# Patient Record
Sex: MALE | NOT HISPANIC OR LATINO | Employment: OTHER | URBAN - METROPOLITAN AREA
[De-identification: names, ages, dates, MRNs, and addresses within clinical notes are randomized per-mention and may not be internally consistent; named-entity substitution may affect disease eponyms.]

---

## 2024-03-22 ENCOUNTER — OFFICE VISIT (OUTPATIENT)
Dept: PRIMARY CARE | Facility: CLINIC | Age: 39
End: 2024-03-22
Payer: COMMERCIAL

## 2024-03-22 VITALS
SYSTOLIC BLOOD PRESSURE: 132 MMHG | TEMPERATURE: 97.9 F | BODY MASS INDEX: 36.58 KG/M2 | RESPIRATION RATE: 18 BRPM | HEART RATE: 76 BPM | WEIGHT: 247 LBS | HEIGHT: 69 IN | OXYGEN SATURATION: 95 % | DIASTOLIC BLOOD PRESSURE: 84 MMHG

## 2024-03-22 DIAGNOSIS — Z00.00 HEALTHCARE MAINTENANCE: ICD-10-CM

## 2024-03-22 DIAGNOSIS — E78.5 HYPERLIPIDEMIA, UNSPECIFIED HYPERLIPIDEMIA TYPE: ICD-10-CM

## 2024-03-22 DIAGNOSIS — R73.9 HYPERGLYCEMIA: ICD-10-CM

## 2024-03-22 DIAGNOSIS — R03.0 ELEVATED BLOOD PRESSURE READING: ICD-10-CM

## 2024-03-22 DIAGNOSIS — E66.9 OBESITY (BMI 30-39.9): ICD-10-CM

## 2024-03-22 DIAGNOSIS — R53.83 FATIGUE, UNSPECIFIED TYPE: Primary | ICD-10-CM

## 2024-03-22 DIAGNOSIS — R79.89 LOW TESTOSTERONE IN MALE: ICD-10-CM

## 2024-03-22 DIAGNOSIS — G47.33 OBSTRUCTIVE SLEEP APNEA: ICD-10-CM

## 2024-03-22 PROCEDURE — 83036 HEMOGLOBIN GLYCOSYLATED A1C: CPT | Performed by: FAMILY MEDICINE

## 2024-03-22 PROCEDURE — 99214 OFFICE O/P EST MOD 30 MIN: CPT | Performed by: FAMILY MEDICINE

## 2024-03-22 PROCEDURE — 1036F TOBACCO NON-USER: CPT | Performed by: FAMILY MEDICINE

## 2024-03-22 ASSESSMENT — PATIENT HEALTH QUESTIONNAIRE - PHQ9
SUM OF ALL RESPONSES TO PHQ9 QUESTIONS 1 AND 2: 0
2. FEELING DOWN, DEPRESSED OR HOPELESS: NOT AT ALL
1. LITTLE INTEREST OR PLEASURE IN DOING THINGS: NOT AT ALL

## 2024-03-22 ASSESSMENT — ENCOUNTER SYMPTOMS
LOSS OF SENSATION IN FEET: 0
SHORTNESS OF BREATH: 0
OCCASIONAL FEELINGS OF UNSTEADINESS: 0
FATIGUE: 1
HEADACHES: 0
DEPRESSION: 0

## 2024-03-22 NOTE — PROGRESS NOTES
"Subjective     Alcon Ramírez is a 38 y.o. male who presents for Weight Gain, Fatigue, and Diabetes.    Fatigue  Associated symptoms include fatigue. Pertinent negatives include no chest pain or headaches.   Diabetes  Pertinent negatives for hypoglycemia include no headaches. Associated symptoms include fatigue. Pertinent negatives for diabetes include no chest pain.      Pt is re-establishing care with me.  He was last seen in 2021.  He has hx of BRIAN however cannot tolerate cpap.  He also has hx of prediabetes/obesity/metabolic syndrome.  He was seeing [facility] for metabolic syndrome and was formerly on trulicity however he stopped it after a few months due to concerns for side effects.  He will be seeing endo again in a few months.  He is concerned about his fatigue.  He has low testosterone history.  He is not on testosterone replacement.      Review of Systems   Constitutional:  Positive for fatigue.   Respiratory:  Negative for shortness of breath.    Cardiovascular:  Negative for chest pain.   Neurological:  Negative for headaches.       Objective     Vitals:    03/22/24 1320   BP: 132/84   BP Location: Left arm   Patient Position: Sitting   Pulse: 76   Resp: 18   Temp: 36.6 °C (97.9 °F)   TempSrc: Temporal   SpO2: 95%   Weight: 112 kg (247 lb)   Height: 1.753 m (5' 9\")      Recheck bp 142/92    No current outpatient medications     No Known Allergies     Past Surgical History:   Procedure Laterality Date    KNEE SURGERY  03/07/2017    Knee Surgery    OTHER SURGICAL HISTORY  03/07/2017    Oral Surgery Tooth Extraction [place] Tooth    WISDOM TOOTH EXTRACTION          Social History     Tobacco Use    Smoking status: Never    Smokeless tobacco: Never   Vaping Use    Vaping status: Never Used   Substance Use Topics    Alcohol use: Not Currently    Drug use: Never        Social History     Substance and Sexual Activity   Alcohol Use Not Currently       Family History   Problem Relation Name Age of Onset    Thyroid " cancer Mother      Breast cancer Mother      Diabetes type II Father          Immunization History   Administered Date(s) Administered    Flu vaccine (IIV4), preservative free *Check age/dose* 10/15/2021    Influenza, injectable, quadrivalent 10/29/2019    MMR vaccine, subcutaneous (MMR II) 08/21/1998    Pfizer Purple Cap SARS-CoV-2 01/03/2022    Pneumococcal Conjugate PCV 7 1985    Tdap vaccine, age 7 year and older (BOOSTRIX, ADACEL) 11/23/2017        Physical Exam  Vitals reviewed.   Constitutional:       General: He is not in acute distress.     Appearance: Normal appearance. He is obese. He is not ill-appearing.   HENT:      Head: Normocephalic and atraumatic.      Right Ear: Tympanic membrane, ear canal and external ear normal.      Left Ear: Tympanic membrane, ear canal and external ear normal.      Nose: Nose normal.      Mouth/Throat:      Mouth: Mucous membranes are moist.      Pharynx: No oropharyngeal exudate or posterior oropharyngeal erythema.   Eyes:      Conjunctiva/sclera: Conjunctivae normal.   Neck:      Thyroid: No thyroid mass or thyromegaly.   Cardiovascular:      Rate and Rhythm: Normal rate and regular rhythm.      Heart sounds: No murmur heard.  Pulmonary:      Effort: No respiratory distress.      Breath sounds: Normal breath sounds. No wheezing, rhonchi or rales.   Abdominal:      General: Abdomen is flat. There is no distension.      Palpations: Abdomen is soft. There is no mass.      Tenderness: There is no abdominal tenderness.   Musculoskeletal:         General: Normal range of motion.   Lymphadenopathy:      Cervical: No cervical adenopathy.   Skin:     General: Skin is warm and dry.      Findings: No lesion or rash.   Neurological:      Mental Status: He is alert and oriented to person, place, and time. Mental status is at baseline.   Psychiatric:         Mood and Affect: Mood normal.         Behavior: Behavior normal.         Problem List Items Addressed This Visit       Low  testosterone in male    Relevant Orders    Testosterone (Completed)    Prolactin (Completed)    Luteinizing Hormone (Completed)    Follicle Stimulating Hormone (Completed)    Obesity (BMI 30-39.9)    Relevant Orders    Referral to Nutrition Services    TSH with reflex to Free T4 if abnormal (Completed)    Hyperlipidemia    Obstructive sleep apnea    Relevant Orders    Referral to Adult Sleep Medicine    Fatigue - Primary    Relevant Orders    Testosterone (Completed)    Vitamin D 25-Hydroxy,Total (for eval of Vitamin D levels) (Completed)    Vitamin B12 (Completed)    TSH with reflex to Free T4 if abnormal (Completed)    Elevated blood pressure reading     Other Visit Diagnoses       Healthcare maintenance        Relevant Orders    Comprehensive Metabolic Panel (Completed)    Lipid Panel (Completed)    CBC and Auto Differential (Completed)    Hyperglycemia        Relevant Orders    POCT glycosylated hemoglobin (Hb A1C) manually resulted (Completed)            Assessment/Plan     Fatigue/low energy/hx of low testosterone - recheck levels, get sleep study rechecked, follow up with Endo.  Pt aware that untreated BRIAN can cause hypogonadism.      Obesity - Healthy diet, routine exercise was discussed with patient  , referral to nutrition     Metabolic syndrome/prediabetes hx - A1c today in office was 5.5%, nondiabetic - formerly on trulicity through  endocrinology however stopped taking it on his own in 2023.  He took the trulicity for 3-4 months total , stopped it due to concern for side effects.     BRIAN - mild - diagnosed in 2021, could not tolerate cpap, stopped using cpap. Recheck sleep study. Referred to sleep specialist.     Elevated blood pressure - Patient was instructed to record blood pressures (using an arm BP monitor) at home 1-2 times per day (per AHA guidelines) and to follow up in office for a blood pressure recheck in 4-6 weeks.  I also encouraged low-sodium diet and regular exercise.  I also discussed  with patient the importance of good blood pressure control to avoid long-term complications such as heart attack and stroke.     Follow up 1 month

## 2024-03-24 DIAGNOSIS — G47.33 OBSTRUCTIVE SLEEP APNEA: Primary | ICD-10-CM

## 2024-03-27 NOTE — PROGRESS NOTES
Subjective   Patient ID: Alcon Ramírez is a 38 y.o. male who presents for Irritable Bowel Syndrome (Pt reports having possible symptoms of IBS-bloating/swelling of abdominal area/feels gassy all day, states 4-6hrs after eating carb like foods, he has to go to the bathroom 3-5 times within 5-10 minutes apart each time, to have bowel movements. No hx of colon/egd).  HPI  Patient with obesity, h/o IBS type of symptoms. Not on any medications.   With carbs he has bloating and has gas.   Leading to a BM, feels he has to go.   Not diarrhea,     Duration: > 5 years.     Gained wt. . 10 lbs,.     Previously seen in GI office in 2022 in 2023  Saw Angeli Webb in the summer of 2021 for bloating, and diarrhea. She check labs, TTG it was unremarkable. He reports since then, his issues have worsened. He reports foul-smelling stools, with associated fecal urgency, and increasing stool frequency. He reports at baseline he has 1 stool per day, he is now having 2-3 loose and at times watery stools. He does have abdominal cramping. He is not sure why this is, because he keeps his food log and cannot find any correlations with his symptoms. He will eat something such as dairy, which will cause him urgency and watery diarrhea, then other days day will not bother him. He denies rectal bleeding or associated weight loss. He does endorse a lot of stress, as he is a , and is having trouble sleeping. No prior colonoscopy.        OV with YVON Webb in 7/21-   34 yo male with h/o BRIAN, ADHD - referred for IBS. Reports gas pain, bloating, diarrhea with certain foods for a few months.      LUQ , LLQ pain, periumbilical pain - 1-2/severity /10   moves bowels daily - can be loose stool, formed or diarrhea   stool is black occasionally - takes Pepto Bismol   denies blood or mucus   denies epigastric pain, heartburn, dysphagia   eats fast food, dairy, carbohydrates - wheat - concerned about wheat allergy or Celiac   weight going up  "  No current outpatient medications on file prior to visit.     No current facility-administered medications on file prior to visit.        Review of Systems   Constitutional:  Negative for chills, fever and unexpected weight change.   HENT:  Negative for trouble swallowing.    Respiratory:  Negative for shortness of breath.    Cardiovascular:  Negative for chest pain.   Gastrointestinal:  Negative for abdominal distention, abdominal pain, anal bleeding, blood in stool, constipation, diarrhea, nausea, rectal pain and vomiting.   Skin:  Negative for color change.       Objective   Physical Exam  Vitals reviewed.   Constitutional:       General: He is awake.      Appearance: Normal appearance.   HENT:      Head: Normocephalic and atraumatic.      Nose: Nose normal.      Mouth/Throat:      Mouth: Mucous membranes are moist.   Eyes:      Pupils: Pupils are equal, round, and reactive to light.   Cardiovascular:      Rate and Rhythm: Normal rate.   Pulmonary:      Effort: Pulmonary effort is normal.   Neurological:      Mental Status: He is alert and oriented to person, place, and time. Mental status is at baseline.   Psychiatric:         Attention and Perception: Attention and perception normal.         Mood and Affect: Mood normal.         Behavior: Behavior normal.       /86 (BP Location: Right arm, Patient Position: Sitting, BP Cuff Size: Large adult)   Pulse 90   Resp 16   Ht 1.753 m (5' 9\")   Wt 112 kg (247 lb 14.4 oz)   SpO2 94%   BMI 36.61 kg/m²      Lab Results   Component Value Date    WBC 6.2 05/13/2022    HGB 13.8 05/13/2022    HCT 42.9 05/13/2022    MCV 84 05/13/2022     05/13/2022           No lab exists for component: \"LABALBU\"    No results found for: \"AFP\"  Lab Results   Component Value Date    TSH 3.18 05/23/2022         Assessment/Plan   Diagnoses and all orders for this visit:  Bloating  -     Celiac Panel; Future  -     Calprotectin Stool; Future  Change in bowel habits  -     " Celiac Panel; Future  -     Calprotectin Stool; Future    Low FODMAP diet.   If symptoms do not improve then next step is upper endoscopy. Please contact us if that is the case.   Call with questions.

## 2024-04-11 ENCOUNTER — LAB (OUTPATIENT)
Dept: LAB | Facility: LAB | Age: 39
End: 2024-04-11
Payer: COMMERCIAL

## 2024-04-11 ENCOUNTER — OFFICE VISIT (OUTPATIENT)
Dept: GASTROENTEROLOGY | Facility: CLINIC | Age: 39
End: 2024-04-11
Payer: COMMERCIAL

## 2024-04-11 VITALS
RESPIRATION RATE: 16 BRPM | OXYGEN SATURATION: 94 % | HEART RATE: 90 BPM | BODY MASS INDEX: 36.72 KG/M2 | WEIGHT: 247.9 LBS | SYSTOLIC BLOOD PRESSURE: 134 MMHG | DIASTOLIC BLOOD PRESSURE: 86 MMHG | HEIGHT: 69 IN

## 2024-04-11 DIAGNOSIS — R19.4 CHANGE IN BOWEL HABITS: ICD-10-CM

## 2024-04-11 DIAGNOSIS — R14.0 BLOATING: ICD-10-CM

## 2024-04-11 DIAGNOSIS — R14.0 BLOATING: Primary | ICD-10-CM

## 2024-04-11 LAB
GLIADIN PEPTIDE IGA SER IA-ACNC: <1 U/ML
TTG IGA SER IA-ACNC: <1 U/ML

## 2024-04-11 PROCEDURE — 83516 IMMUNOASSAY NONANTIBODY: CPT

## 2024-04-11 PROCEDURE — 36415 COLL VENOUS BLD VENIPUNCTURE: CPT

## 2024-04-11 PROCEDURE — 99214 OFFICE O/P EST MOD 30 MIN: CPT | Performed by: INTERNAL MEDICINE

## 2024-04-11 ASSESSMENT — ENCOUNTER SYMPTOMS
COLOR CHANGE: 0
CONSTIPATION: 0
UNEXPECTED WEIGHT CHANGE: 0
CHILLS: 0
DIARRHEA: 0
ABDOMINAL DISTENTION: 0
TROUBLE SWALLOWING: 0
ABDOMINAL PAIN: 0
NAUSEA: 0
BLOOD IN STOOL: 0
SHORTNESS OF BREATH: 0
FEVER: 0
VOMITING: 0
ANAL BLEEDING: 0
RECTAL PAIN: 0

## 2024-04-11 NOTE — PATIENT INSTRUCTIONS
Bloating  -     Celiac Panel; Future  -     Calprotectin Stool; Future  Change in bowel habits  -     Celiac Panel; Future  -     Calprotectin Stool; Future    Low FODMAP diet.   If symptoms do not improve then next step is upper endoscopy.   Call with questions.

## 2024-04-12 ENCOUNTER — LAB (OUTPATIENT)
Dept: LAB | Facility: LAB | Age: 39
End: 2024-04-12
Payer: COMMERCIAL

## 2024-04-12 DIAGNOSIS — R19.4 CHANGE IN BOWEL HABITS: ICD-10-CM

## 2024-04-12 DIAGNOSIS — R53.83 FATIGUE, UNSPECIFIED TYPE: ICD-10-CM

## 2024-04-12 DIAGNOSIS — R14.0 BLOATING: ICD-10-CM

## 2024-04-12 DIAGNOSIS — R79.89 LOW TESTOSTERONE IN MALE: ICD-10-CM

## 2024-04-12 DIAGNOSIS — E66.9 OBESITY (BMI 30-39.9): ICD-10-CM

## 2024-04-12 DIAGNOSIS — Z00.00 HEALTHCARE MAINTENANCE: ICD-10-CM

## 2024-04-12 LAB
25(OH)D3 SERPL-MCNC: 13 NG/ML (ref 30–100)
ALBUMIN SERPL BCP-MCNC: 4.6 G/DL (ref 3.4–5)
ALP SERPL-CCNC: 37 U/L (ref 33–120)
ALT SERPL W P-5'-P-CCNC: 37 U/L (ref 10–52)
ANION GAP SERPL CALC-SCNC: 11 MMOL/L (ref 10–20)
AST SERPL W P-5'-P-CCNC: 20 U/L (ref 9–39)
BASOPHILS # BLD AUTO: 0.07 X10*3/UL (ref 0–0.1)
BASOPHILS NFR BLD AUTO: 1.2 %
BILIRUB SERPL-MCNC: 1.1 MG/DL (ref 0–1.2)
BUN SERPL-MCNC: 17 MG/DL (ref 6–23)
CALCIUM SERPL-MCNC: 9.4 MG/DL (ref 8.6–10.3)
CHLORIDE SERPL-SCNC: 104 MMOL/L (ref 98–107)
CHOLEST SERPL-MCNC: 225 MG/DL (ref 0–199)
CHOLESTEROL/HDL RATIO: 4.7
CO2 SERPL-SCNC: 28 MMOL/L (ref 21–32)
CREAT SERPL-MCNC: 0.94 MG/DL (ref 0.5–1.3)
EGFRCR SERPLBLD CKD-EPI 2021: >90 ML/MIN/1.73M*2
EOSINOPHIL # BLD AUTO: 0.06 X10*3/UL (ref 0–0.7)
EOSINOPHIL NFR BLD AUTO: 1.1 %
ERYTHROCYTE [DISTWIDTH] IN BLOOD BY AUTOMATED COUNT: 14.4 % (ref 11.5–14.5)
FSH SERPL-ACNC: 5.3 IU/L
GLUCOSE SERPL-MCNC: 113 MG/DL (ref 74–99)
HCT VFR BLD AUTO: 45 % (ref 41–52)
HDLC SERPL-MCNC: 47.7 MG/DL
HGB BLD-MCNC: 14.6 G/DL (ref 13.5–17.5)
IMM GRANULOCYTES # BLD AUTO: 0.02 X10*3/UL (ref 0–0.7)
IMM GRANULOCYTES NFR BLD AUTO: 0.4 % (ref 0–0.9)
LDLC SERPL CALC-MCNC: 123 MG/DL
LH SERPL-ACNC: 3.7 IU/L
LYMPHOCYTES # BLD AUTO: 1.8 X10*3/UL (ref 1.2–4.8)
LYMPHOCYTES NFR BLD AUTO: 32.1 %
MCH RBC QN AUTO: 27.3 PG (ref 26–34)
MCHC RBC AUTO-ENTMCNC: 32.4 G/DL (ref 32–36)
MCV RBC AUTO: 84 FL (ref 80–100)
MONOCYTES # BLD AUTO: 0.31 X10*3/UL (ref 0.1–1)
MONOCYTES NFR BLD AUTO: 5.5 %
NEUTROPHILS # BLD AUTO: 3.35 X10*3/UL (ref 1.2–7.7)
NEUTROPHILS NFR BLD AUTO: 59.7 %
NON HDL CHOLESTEROL: 177 MG/DL (ref 0–149)
NRBC BLD-RTO: 0 /100 WBCS (ref 0–0)
PLATELET # BLD AUTO: 133 X10*3/UL (ref 150–450)
POTASSIUM SERPL-SCNC: 4.3 MMOL/L (ref 3.5–5.3)
PROLACTIN SERPL-MCNC: 5.8 UG/L (ref 2–18)
PROT SERPL-MCNC: 7.3 G/DL (ref 6.4–8.2)
RBC # BLD AUTO: 5.35 X10*6/UL (ref 4.5–5.9)
SODIUM SERPL-SCNC: 139 MMOL/L (ref 136–145)
TESTOST SERPL-MCNC: 164 NG/DL (ref 240–1000)
TRIGL SERPL-MCNC: 272 MG/DL (ref 0–149)
TSH SERPL-ACNC: 1.73 MIU/L (ref 0.44–3.98)
VIT B12 SERPL-MCNC: 329 PG/ML (ref 211–911)
VLDL: 54 MG/DL (ref 0–40)
WBC # BLD AUTO: 5.6 X10*3/UL (ref 4.4–11.3)

## 2024-04-12 PROCEDURE — 80053 COMPREHEN METABOLIC PANEL: CPT

## 2024-04-12 PROCEDURE — 84443 ASSAY THYROID STIM HORMONE: CPT

## 2024-04-12 PROCEDURE — 85025 COMPLETE CBC W/AUTO DIFF WBC: CPT

## 2024-04-12 PROCEDURE — 84146 ASSAY OF PROLACTIN: CPT

## 2024-04-12 PROCEDURE — 82306 VITAMIN D 25 HYDROXY: CPT

## 2024-04-12 PROCEDURE — 36415 COLL VENOUS BLD VENIPUNCTURE: CPT

## 2024-04-12 PROCEDURE — 83001 ASSAY OF GONADOTROPIN (FSH): CPT

## 2024-04-12 PROCEDURE — 84403 ASSAY OF TOTAL TESTOSTERONE: CPT

## 2024-04-12 PROCEDURE — 80061 LIPID PANEL: CPT

## 2024-04-12 PROCEDURE — 83993 ASSAY FOR CALPROTECTIN FECAL: CPT

## 2024-04-12 PROCEDURE — 82607 VITAMIN B-12: CPT

## 2024-04-12 PROCEDURE — 83002 ASSAY OF GONADOTROPIN (LH): CPT

## 2024-04-13 LAB
GLIADIN PEPTIDE IGG SER IA-ACNC: <0.56 FLU (ref 0–4.99)
TTG IGG SER IA-ACNC: <0.82 FLU (ref 0–4.99)

## 2024-04-15 NOTE — RESULT ENCOUNTER NOTE
Hi Mr. Ramírez,   The blood work is normal, which means no evidence of celiac disease.   Sincerely,   Darius Camacho MD

## 2024-04-16 LAB — CALPROTECTIN STL-MCNT: 6 UG/G

## 2024-04-24 ENCOUNTER — OFFICE VISIT (OUTPATIENT)
Dept: PRIMARY CARE | Facility: CLINIC | Age: 39
End: 2024-04-24
Payer: COMMERCIAL

## 2024-04-24 VITALS
TEMPERATURE: 97.9 F | SYSTOLIC BLOOD PRESSURE: 135 MMHG | HEIGHT: 69 IN | BODY MASS INDEX: 36.58 KG/M2 | WEIGHT: 247 LBS | RESPIRATION RATE: 20 BRPM | DIASTOLIC BLOOD PRESSURE: 89 MMHG | HEART RATE: 84 BPM | OXYGEN SATURATION: 95 %

## 2024-04-24 DIAGNOSIS — G47.33 OBSTRUCTIVE SLEEP APNEA: ICD-10-CM

## 2024-04-24 DIAGNOSIS — R03.0 ELEVATED BLOOD PRESSURE READING: ICD-10-CM

## 2024-04-24 DIAGNOSIS — R79.89 LOW TESTOSTERONE IN MALE: ICD-10-CM

## 2024-04-24 DIAGNOSIS — D69.6 THROMBOCYTOPENIA (CMS-HCC): ICD-10-CM

## 2024-04-24 DIAGNOSIS — E78.5 HYPERLIPIDEMIA, UNSPECIFIED HYPERLIPIDEMIA TYPE: Primary | ICD-10-CM

## 2024-04-24 LAB — POC HEMOGLOBIN A1C: 5.5 % (ref 4.2–6.5)

## 2024-04-24 PROCEDURE — 99395 PREV VISIT EST AGE 18-39: CPT | Performed by: FAMILY MEDICINE

## 2024-04-24 PROCEDURE — 93000 ELECTROCARDIOGRAM COMPLETE: CPT | Performed by: FAMILY MEDICINE

## 2024-04-24 ASSESSMENT — PATIENT HEALTH QUESTIONNAIRE - PHQ9
2. FEELING DOWN, DEPRESSED OR HOPELESS: NOT AT ALL
SUM OF ALL RESPONSES TO PHQ9 QUESTIONS 1 AND 2: 0
1. LITTLE INTEREST OR PLEASURE IN DOING THINGS: NOT AT ALL

## 2024-04-24 ASSESSMENT — ENCOUNTER SYMPTOMS
SHORTNESS OF BREATH: 0
HEADACHES: 0

## 2024-04-24 NOTE — PROGRESS NOTES
"Subjective     Alcon Ramírez is a 38 y.o. male who presents for Annual Exam.    HPI     Pt is here today for annual well exam.     He is also here to get his blood pressure rechecked.  It was mildly elevated at his last visit on 3/22/24.  He checked his bp one time and was told it was \"normal\".      He has sleep specialist appointment scheduled for May    Review of Systems   Respiratory:  Negative for shortness of breath.    Cardiovascular:  Negative for chest pain.   Neurological:  Negative for headaches.       Objective     Vitals:    04/24/24 1414   BP: 135/89   BP Location: Left arm   Patient Position: Sitting   Pulse: 84   Resp: 20   Temp: 36.6 °C (97.9 °F)   TempSrc: Temporal   SpO2: 95%   Weight: 112 kg (247 lb)   Height: 1.753 m (5' 9\")        No current outpatient medications     No Known Allergies     Past Surgical History:   Procedure Laterality Date    KNEE SURGERY  03/07/2017    Knee Surgery    OTHER SURGICAL HISTORY  03/07/2017    Oral Surgery Tooth Extraction [place] Tooth    WISDOM TOOTH EXTRACTION          Social History     Tobacco Use    Smoking status: Never    Smokeless tobacco: Never   Vaping Use    Vaping status: Never Used   Substance Use Topics    Alcohol use: Not Currently    Drug use: Never        Social History     Substance and Sexual Activity   Alcohol Use Not Currently       Family History   Problem Relation Name Age of Onset    Thyroid cancer Mother      Breast cancer Mother      Diabetes type II Father          Immunization History   Administered Date(s) Administered    Flu vaccine (IIV4), preservative free *Check age/dose* 10/15/2021    Influenza, injectable, quadrivalent 10/29/2019    MMR vaccine, subcutaneous (MMR II) 08/21/1998    Pfizer Purple Cap SARS-CoV-2 01/03/2022    Pneumococcal Conjugate PCV 7 1985    Tdap vaccine, age 7 year and older (BOOSTRIX, ADACEL) 11/23/2017        Physical Exam  Vitals reviewed.   Constitutional:       General: He is not in acute distress.    "  Appearance: Normal appearance. He is obese.   HENT:      Head: Normocephalic and atraumatic.      Right Ear: Tympanic membrane, ear canal and external ear normal.      Left Ear: Tympanic membrane, ear canal and external ear normal.      Nose: Nose normal.      Mouth/Throat:      Mouth: Mucous membranes are moist.      Pharynx: Oropharynx is clear. No oropharyngeal exudate or posterior oropharyngeal erythema.   Eyes:      Extraocular Movements: Extraocular movements intact.      Pupils: Pupils are equal, round, and reactive to light.   Neck:      Thyroid: No thyroid mass or thyromegaly.   Cardiovascular:      Rate and Rhythm: Normal rate and regular rhythm.      Heart sounds: No murmur heard.  Pulmonary:      Effort: Pulmonary effort is normal. No respiratory distress.      Breath sounds: Normal breath sounds. No wheezing, rhonchi or rales.   Abdominal:      General: Abdomen is flat. There is no distension.      Palpations: Abdomen is soft.      Tenderness: There is no abdominal tenderness.   Genitourinary:     Testes: Normal.   Musculoskeletal:         General: Normal range of motion.   Lymphadenopathy:      Cervical: No cervical adenopathy.   Skin:     General: Skin is warm and dry.      Findings: No rash.   Neurological:      General: No focal deficit present.      Mental Status: He is alert and oriented to person, place, and time. Mental status is at baseline.   Psychiatric:         Mood and Affect: Mood normal.         Behavior: Behavior normal.         Problem List Items Addressed This Visit       Low testosterone in male    Hyperlipidemia - Primary    Relevant Orders    Lipid Panel    CT cardiac scoring wo IV contrast    Obstructive sleep apnea    Relevant Orders    CT cardiac scoring wo IV contrast    Elevated blood pressure reading    Relevant Orders    CT cardiac scoring wo IV contrast     Other Visit Diagnoses       Thrombocytopenia (CMS-HCC)        Relevant Orders    CBC and Auto Differential             Assessment/Plan     Well Exam     Vaccines - tdap utd     I recommend yearly flu vaccine and routine COVID vaccinations as indicated     Recent labs reviewed today with patient     Healthy diet, routine exercise was discussed with patient      Fatigue/low energy/hx of low testosterone - recheck labs show low testosterone, pt advised to follow up with his endocrinologist and complete sleep study     Obesity - Healthy diet, routine exercise was discussed with patient  , referred  to nutrition at last appointment     Metabolic syndrome/prediabetes hx -follow up with endocrinology     Hx of BRIAN - mild - diagnosed in 2021, could not tolerate cpap, stopped using cpap. Recheck sleep study. Referred to sleep specialist at last visit.     Vit D def - advise 2000 I.U. of vit D3 daily (over the counter)       Thrombocytopenia, mild - stable      HLD/Hypertrig - mild - try to reduce saturated/trans fat, sugars/carbs and get routine aerobic exercise as tolerated.  I recommend repeating a lipid panel lab test in approximately 3 months.  The lab is ordered.       Cardiovascular evaluation in 2022 for heart palpitations with negative cardiac stress testing and normal echo done.      Cardiac risk assessment - I will order a CT calcium score due to patient risk factors.       IBS - sees GI    Elevated blood pressure - pt declined antihypertensive medication at this time.  He will try life style modifications.  Patient was instructed to record blood pressures (using an arm BP monitor) at home 1-2 times per day (per AHA guidelines) and to follow up in office for a blood pressure recheck in 4-6 weeks.  I also encouraged low-sodium diet and regular exercise.  I also discussed with patient the importance of good blood pressure control to avoid long-term complications such as heart attack and stroke.      Follow up 1 month for bp recheck.

## 2024-05-01 NOTE — RESULT ENCOUNTER NOTE
Stool test results are normal which means no evidence of inflammation was identified on the test.  If you continue to have GI symptoms please reach out to my office and neck step would be an upper endoscopy.  Do not hesitate to contact me if you have any questions or concerns.  Sincerely,

## 2024-05-21 ENCOUNTER — CLINICAL SUPPORT (OUTPATIENT)
Dept: SLEEP MEDICINE | Facility: CLINIC | Age: 39
End: 2024-05-21
Payer: COMMERCIAL

## 2024-05-21 VITALS — BODY MASS INDEX: 36.57 KG/M2 | WEIGHT: 246.91 LBS | HEIGHT: 69 IN

## 2024-05-21 DIAGNOSIS — G47.33 OBSTRUCTIVE SLEEP APNEA: ICD-10-CM

## 2024-05-21 PROCEDURE — 95810 POLYSOM 6/> YRS 4/> PARAM: CPT | Performed by: INTERNAL MEDICINE

## 2024-05-21 ASSESSMENT — SLEEP AND FATIGUE QUESTIONNAIRES
HOW LIKELY ARE YOU TO NOD OFF OR FALL ASLEEP WHILE LYING DOWN TO REST IN THE AFTERNOON WHEN CIRCUMSTANCES PERMIT: WOULD NEVER DOZE
HOW LIKELY ARE YOU TO NOD OFF OR FALL ASLEEP WHILE SITTING AND READING: MODERATE CHANCE OF DOZING
HOW LIKELY ARE YOU TO NOD OFF OR FALL ASLEEP WHILE SITTING AND TALKING TO SOMEONE: WOULD NEVER DOZE
HOW LIKELY ARE YOU TO NOD OFF OR FALL ASLEEP WHILE WATCHING TV: MODERATE CHANCE OF DOZING
ESS-CHAD TOTAL SCORE: 4
HOW LIKELY ARE YOU TO NOD OFF OR FALL ASLEEP IN A CAR, WHILE STOPPED FOR A FEW MINUTES IN TRAFFIC: WOULD NEVER DOZE
HOW LIKELY ARE YOU TO NOD OFF OR FALL ASLEEP WHEN YOU ARE A PASSENGER IN A CAR FOR AN HOUR WITHOUT A BREAK: WOULD NEVER DOZE
HOW LIKELY ARE YOU TO NOD OFF OR FALL ASLEEP WHILE SITTING QUIETLY AFTER LUNCH WITHOUT ALCOHOL: WOULD NEVER DOZE
SITING INACTIVE IN A PUBLIC PLACE LIKE A CLASS ROOM OR A MOVIE THEATER: WOULD NEVER DOZE

## 2024-05-21 NOTE — PROGRESS NOTES
[facility] TECH NOTE:     Patient: Alcon Ramírez   MRN//AGE: [id number]  1985  38 y.o.   Technologist: Francheska Cabrera   Room: 2   Service Date: 2024        Sleep Testing Location: [place]    [place]: 4    TECHNOLOGIST SLEEP STUDY PROCEDURE NOTE:   This sleep study is being conducted according to the policies and procedures outlined by the AAS accreditation standards.  The sleep study procedure and processes involved during this appointment was explained to the patient/patient’s family, questions were answered. The patient/family verbalized understanding.      The patient is a 38 y.o. year old male scheduled for a Diagnostic PSG Split night with montage of:  Split . he arrived for his appointment.      The study that was ultimately completed was a Diagnostic PSG Split night with montage of:  PSG .    The full study Was completed.  Patient questionnaires completed?: yes     Consents signed? yes    Initial Fall Risk Screening:     Alcon has not fallen in the last 6 months. his fall did not result in injury. Alcon does not have a fear of falling. He does not need assistance with sitting, standing, or walking. he does not need assistance walking in his home. he does not need assistance in an unfamiliar setting. The patient is not using an assistive device.     Brief Study observations: The patient arrived for his ordered Split study, stating he was diagnosed with mild BRIAN in the past and needs a new machine. The patient did not consent to use the PAP machine, so the study was run as a Diagnostic PSG only. The hook-up was done and the purpose of all sensors was explained. A normal sleep latency was seen, but followed by frequent arousals and WASO. Once asleep, intermittent moderate snoring was heard. Some respiratory events were noted. All sleep stages were seen. REM supine was not captured. The patient was observed sleeping in supine, right side, left side, and prone positions. NSR was observed throughout the study.      Deviation to order/protocol and reason: The patient did not consent to use the PAP machine      If PAP, which was preferred mask/pressure/mode: N/A      Other: None    After the procedure, the patient/family was informed to ensure followup with ordering clinician for testing results.      Technologist: Francheska Cabrera

## 2024-05-23 DIAGNOSIS — G47.33 OBSTRUCTIVE SLEEP APNEA: Primary | ICD-10-CM

## 2024-05-30 NOTE — PROGRESS NOTES
Patient: Alcon Ramírez  : 1985 AGE: 38 y.o. SEX:male   MRN: [id number]   Provider: ADRIANNA Cash     Location [facility]   Service Date: 2024     PCP: Roni Montgomery DO   Referred by: Roni Montgomery DO          [facility]  New Visit Note      HISTORY OF PRESENT ILLNESS     Alcon Ramírez is a 38 y.o. male with a h/o BRIAN, Obesity, and HLD  who presents to [facility].    2024 : NPV with concerns of BRIAN management. Recent sleep study. Was dx with mild BRIAN in , was noncompliant with PAP due to lack of education/assistance with CPAP and was bothered with air leak. Has also tried 2 different OTC oral appliances which he could not tolerate. Now more symptomatic. ----> APAP 5-15 CWP ordered via [facility].     SLEEP STUDY HISTORY (personally reviewed raw data such as interpretation report, data sheet, hypnogram, and titration table if available and applicable)  - PSG 24- showing RDI 3% 21, RDI 4% 10.6, SpO2 ulises 88%     SLEEP-WAKE SCHEDULE    Sleep Patterns: He does have a usual bed partner. In terms of the patient's sleep/wake cycle, he generally gets into bed at approximately 11 PM.  He  watching TV or playing on phone , and his latency to sleep onset after lights out is 20-30min. During the night, the patient generally awakens 3-4 times nightly. These awakenings are brief in duration. Final wake time on weekend mornings is around 7-8 AM.    Compared to weekdays (work week), the patient's sleep schedule is  similar on the weekends (off work).     Breathing during sleep: snoring, gasping/choking for air, and nocturnal reflux symptoms  Behaviors at night: No   Sleep paralysis: No   Hypnogogic or hypnopompic hallucinations: No   Cataplexy: No     Leg symptoms and timing:  - Sensations: Patient does not have unusual sensations in their extremities that cause an urge to move them     Daytime Symptoms:  On  "awakening patient reports: wake unrefreshed, feels sleepy, morning headaches, and morning dry mouth  Patient report some daytime symptoms including: DAYTIME SYMPTOMS: reports sleep inertia difficulty with memory or concentration during the day feeling sleepy when driving  Patient denies daytime symptoms including: Denies: excessive daytime sleepiness late to work/school due to sleepiness irritability during the day    Sleep environment:  Preferred sleep position: stomach  Room is dark: Yes  Room is quiet: Yes  Room is cool: Yes  Bed comfort: good    SLEEP HABITS  Caffeine consumption: Yes, 1 cups/day  Alcohol consumption: No  Smoking: No  Marijuana: No  Sleep aids: denies     WEIGHT: gained 30lbs in 3 years     ESS: 5  MONTSERRAT: 13    REVIEW OF SYSTEMS     All other systems have been reviewed and are negative.    ALLERGIES     No Known Allergies    MEDICATIONS     No current outpatient medications on file.     No current facility-administered medications for this visit.       PAST HISTORIES     PERTINENT PAST MEDICAL HISTORY: See HPI    PERTINENT PAST SURGICAL HISTORY for Sleep Medicine:  non-contributory    PERTINENT FAMILY HISTORY for Sleep Medicine:  sleep apnea- father     PERTINENT SOCIAL HISTORY:  He  reports that he has never smoked. He has never used smokeless tobacco. He reports that he does not currently use alcohol. He reports that he does not use drugs. He currently lives with spouse and employed as .     Active Problems, Allergy List, Medication List, and PMH/PSH/FH/Social Hx have been reviewed and reconciled in chart. No significant changes unless documented in the pertinent chart section. Updates made when necessary.     PHYSICAL EXAM     VITAL SIGNS: /85   Pulse 80   Resp 18   Ht 1.753 m (5' 9\")   Wt 112 kg (247 lb 11.2 oz)   SpO2 93%   BMI 36.58 kg/m²     CURRENT WEIGHT:   Vitals:    06/06/24 1354   Weight: 112 kg (247 lb 11.2 oz)      PREVIOUS WEIGHTS:  Wt Readings from Last 3 " "Encounters:   06/06/24 112 kg (247 lb 11.2 oz)   05/21/24 112 kg (246 lb 14.6 oz)   04/24/24 112 kg (247 lb)     Physical Exam  Constitutional: Awake, not in distress  Lungs: Clear to auscultation bilateral, no cough noted  Heart: Regular rate and rhythm  Skin: Warm, no rash  Neuro: No tremors, moves all extremities  Psych: alert and oriented to time, place, and person    HEENT:   Tonsils enlargement grade 2+   Airway comments: narrow lateral walls   Tongue scalloping: slight   Modified Mallampati score - 3    RESULTS/DATA     No results found for: \"IRON\", \"TRANSFERRIN\", \"IRONSAT\", \"TIBC\", \"FERRITIN\"    Bicarbonate   Date Value Ref Range Status   04/12/2024 28 21 - 32 mmol/L Final       ASSESSMENT/PLAN     Mr. Ramírez is a 38 y.o. male and He was referred to the [facility] for evaluation of BRIAN    Problem List, Orders, Assessment, Recommendations:    # BRIAN, mild  - PSG 5/21/24- showing RDI 3% 21, RDI 4% 10.6, SpO2 ulises 88%   - will start APAP 5-15 cwp via [facility]  -Sleep apnea, PAP therapy education as well as the tips to be successful with PAP treatment was provided at length in clinic today. Patient verbalized understanding.  - Discussed 30-day mask guarantee and insurance requirement regarding PAP compliance and follow-up.   -Diet, exercise, and weight loss were emphasized today in clinic, as were non-supine sleep, avoiding alcohol in the late evening, and driving or operating heavy machinery when sleepy.   - patient will follow-up in 1-3 months and bring equipment to the follow-up clinic      #Obesity  BMI Readings from Last 1 Encounters:   06/06/24 36.58 kg/m²     - Encouraged healthy weight loss via diet and exercise  - Weight loss can help in the long term treatment of BRIAN.  - Defer management to PCP     All of patient's questions were answered. He verbalizes understanding and agreement with my assessment and plan.    Disposition    Return to clinic in 3 months   "

## 2024-06-06 ENCOUNTER — OFFICE VISIT (OUTPATIENT)
Dept: SLEEP MEDICINE | Facility: CLINIC | Age: 39
End: 2024-06-06
Payer: COMMERCIAL

## 2024-06-06 VITALS
WEIGHT: 247.7 LBS | RESPIRATION RATE: 18 BRPM | HEART RATE: 80 BPM | SYSTOLIC BLOOD PRESSURE: 128 MMHG | DIASTOLIC BLOOD PRESSURE: 85 MMHG | HEIGHT: 69 IN | BODY MASS INDEX: 36.69 KG/M2 | OXYGEN SATURATION: 93 %

## 2024-06-06 DIAGNOSIS — E66.9 OBESITY (BMI 30-39.9): ICD-10-CM

## 2024-06-06 DIAGNOSIS — Z78.9 NONSMOKER: ICD-10-CM

## 2024-06-06 DIAGNOSIS — G47.33 OBSTRUCTIVE SLEEP APNEA: Primary | ICD-10-CM

## 2024-06-06 PROCEDURE — 99204 OFFICE O/P NEW MOD 45 MIN: CPT | Performed by: NURSE PRACTITIONER

## 2024-06-06 PROCEDURE — 1036F TOBACCO NON-USER: CPT | Performed by: NURSE PRACTITIONER

## 2024-06-06 ASSESSMENT — COLUMBIA-SUICIDE SEVERITY RATING SCALE - C-SSRS
2. HAVE YOU ACTUALLY HAD ANY THOUGHTS OF KILLING YOURSELF?: NO
6. HAVE YOU EVER DONE ANYTHING, STARTED TO DO ANYTHING, OR PREPARED TO DO ANYTHING TO END YOUR LIFE?: NO
1. IN THE PAST MONTH, HAVE YOU WISHED YOU WERE DEAD OR WISHED YOU COULD GO TO SLEEP AND NOT WAKE UP?: NO

## 2024-06-06 ASSESSMENT — SLEEP AND FATIGUE QUESTIONNAIRES
SITING INACTIVE IN A PUBLIC PLACE LIKE A CLASS ROOM OR A MOVIE THEATER: WOULD NEVER DOZE
ESS-CHAD TOTAL SCORE: 5
WORRIED_DISTRESSED_DUE_TO_SLEEP: SOMEWHAT
HOW LIKELY ARE YOU TO NOD OFF OR FALL ASLEEP IN A CAR, WHILE STOPPED FOR A FEW MINUTES IN TRAFFIC: WOULD NEVER DOZE
HOW LIKELY ARE YOU TO NOD OFF OR FALL ASLEEP WHILE SITTING AND TALKING TO SOMEONE: WOULD NEVER DOZE
HOW LIKELY ARE YOU TO NOD OFF OR FALL ASLEEP WHILE SITTING QUIETLY AFTER LUNCH WITHOUT ALCOHOL: WOULD NEVER DOZE
DIFFICULTY_STAYING_ASLEEP: MODERATE
DIFFICULTY_FALLING_ASLEEP: MILD
SATISFACTION_WITH_CURRENT_SLEEP_PATTERN: DISSATISFIED
HOW LIKELY ARE YOU TO NOD OFF OR FALL ASLEEP WHEN YOU ARE A PASSENGER IN A CAR FOR AN HOUR WITHOUT A BREAK: WOULD NEVER DOZE
HOW LIKELY ARE YOU TO NOD OFF OR FALL ASLEEP WHILE WATCHING TV: MODERATE CHANCE OF DOZING
SLEEP_PROBLEM_NOTICEABLE_TO_OTHERS: A LITTLE
SLEEP_PROBLEM_INTERFERES_DAILY_ACTIVITIES: SOMEWHAT
WAKING_TOO_EARLY: MODERATE
HOW LIKELY ARE YOU TO NOD OFF OR FALL ASLEEP WHILE LYING DOWN TO REST IN THE AFTERNOON WHEN CIRCUMSTANCES PERMIT: SLIGHT CHANCE OF DOZING
HOW LIKELY ARE YOU TO NOD OFF OR FALL ASLEEP WHILE SITTING AND READING: MODERATE CHANCE OF DOZING

## 2024-06-06 ASSESSMENT — ENCOUNTER SYMPTOMS
DEPRESSION: 0
OCCASIONAL FEELINGS OF UNSTEADINESS: 0
LOSS OF SENSATION IN FEET: 0

## 2024-06-06 NOTE — PATIENT INSTRUCTIONS
[facility]   [facility]  [address] [facility] [address] [address]  [phone]       NAME: Alcon Ramírez   DATE: 06/06/24    Your Sleep Provider Today: ADRIANNA Cash  Your Primary Care Physician: Roni Montgomery DO       DIAGNOSIS:   1. Obstructive sleep apnea  Referral to Adult Sleep Medicine    Referral to Adult Sleep Medicine      2. Obesity (BMI 30-39.9)            Thank you for coming to the Sleep Medicine Clinic today! Your sleep medicine provider today was: ADRIANNA Cash Below is a summary of your treatment plan, other important information, and our contact numbers:      TREATMENT PLAN     - Follow-up in 3 months.  - If not already done, sign up for 'My Chart' and send prescription requests or messages through this    Obstructive sleep apnea (BRIAN): BRIAN is a sleep disorder where your upper airway muscles relax during sleep and the airway intermittently and repetitively narrows and collapses leading to blocked airway (apnea) which, in turn, can disrupt breathing in sleep, lower oxygen levels while you sleep and cause night time wakings. Because apnea may cause higher carbon dioxide or low oxygen levels, untreated BRIAN can lead to heart arrhythmia, elevation of blood pressure, and make it harder for the body to consolidate memory and metabolize (leading to higher blood sugars at night).   Frequent partial arousals occur during sleep resulting in sleep deprivation and daytime sleepiness. BRIAN is associated with an increased risk of cardiovascular disease, stroke, hypertension, and insulin resistance. Moreover, untreated BRIAN with excessive daytime sleepiness can increase the risk of motor vehicular accidents.    Some conservative strategies for BRIAN are:   Positional therapy - Avoid sleeping on your back.   Healthy diet, exercise, and optimizing weight encouraged.   Avoid alcohol late in the evening as it can make sleep apnea worse.  "    Safety: Avoid driving and operating heavy equipment while sleepy. Drowsy driving may lead to life-threatening motor vehicle accidents.     Common treatment options for sleep apnea include weight management, positional therapy, Positive Airway Therapy (PAP) therapy, oral appliance therapy, hypoglossal nerve stimulation, and select airway surgeries.    Starting Positive Airway Pressure: You were ordered a device to wear when you sleep called PAP (Positive Airway Pressure) to treat your sleep apnea. The order will be submitted to a durable medical equipment company who will arrange setting you up with the device. They will provide all the necessary equipment and discuss use and maintenance of the device with you.     **Please bring all PAP equipment with you to follow up appointments unless told otherwise.**           Important things to keep in mind as you start PAP    Insurance will monitor your usage during the first 90 days.  You should use your PAP - \"all night, every night\", for your health. The bare minimum is to use your PAP device while sleeping = at least 4 hours per day at least 5 days per week. Otherwise, your PAP device may be reclaimed by your PAP vendor at 90 days.  There are many mask to choose from to wear with your PAP machine. If you are not comfortable with the first mask issued to you, call your DME and ask for another option to try (within the first 30 days).  Discuss with your provider if you are having issues breathing with the machine or the temperature or humidity feel uncomfortable.  Expect to have an adjustment period when you start your device. It helps to continuing wearing the machine every day for a period of time until you get more used to it. You can practice with wearing the mask alone if you need, then add in the PAP air pressure a few days later.   Reach out for help if you are struggling! The sleep medicine department can be reached at [phone]  We encourage you to download " data monitoring apps to your phone. For ResMed AirSense 10/11 - MyAir tri. For InfoDif - DreamMapper. Both are available in the Tri store for free and are a great tool to monitor your progress with your CPAP night to night.    IF YOU ARE HAVING TROUBLE GETTING USED TO YOUR PAP DEVICE    The following steps are recommended to help you get accustomed to using CPAP and improve your CPAP usage at home:    Use CPAP every night for 5 to 10 minutes while awake in the evening without fail.  Be sure to remain awake while breathing on the nasal mask for the first 1 to 2 weeks.  After 2 to 4 weeks, start using CPAP at night when you go to sleep…But be sure to take the mask off if you have not fallen asleep in 5 to 10 minutes, or if you fall asleep.  Take the mask off whenever you become aware of it or the moment you wake up.   Continue this process every night, with confidence that you will gradually become accustomed to using CPAP over time.    Be sure you are using a comfortable mask, and that you are applying it snugly (but not too tight).    Common issues with PAP machine:  Mask gets dislodged when turning to the side: Consider getting a CPAP pillow or switching to a mask with hose on top.     Dry mouth:  Your machine has built-in humidifier that heats up the air to prevent dry mouth. It can be adjusted to your comfort. You can try that first and increase setting one level one night at a time to check which setting is comfortable and effective in lessening dry mouth. If dry mouth persists despite humidity setting adjustment, may apply OTC Biotene gel over the gums at bedtime.  If Biotene gel is not effective, consider trying XEROSTOM gel from [employer].  If using nasal pillows or nasal mask, consider adding chinstrap or mouth tape to keep your mouth closed while you sleep. Also, eliminate or reduce dose of meds that can cause dry mouth if possible. Lastly, may try getting a separate room humidifier  "machine.    Airleaks: Please call DME as they may need to adjust your mask or refit you with a different kind of mask. In addition, you can ask DME for tips on getting a good mask seal and mask fit.     Difficulty tolerating the mask: Contact your DME to try a different kind of mask and/or call office to get a referral to Sleep Psychologist for CPAP desensitization. CPAP desensitization technique is a set of strategies that helps patient cope with claustrophobia and anxiety related to wearing mask. Alternatively, we can do a daytime mini-sleep study called PAP-nap trial wherein you will try on different kinds of mask and the sleep technician will try different pressure settings on CPAP and BPAP machines to see which specific pressure is tolerable and comfortable for you.     Water droplets or moisture within the hose and/or mask: This is called rain-out and it is caused by condensation of too much heated humidity on the cooler walls of the hose. If you have rain-out, turn down/decrease your humidity setting or get a heated hose. If you already have a heated hose, turn up the \"tube temperature\" of the heated hose. Alternatively, if you don't want to get a heated hose or warmer air, may wrap the CPAP hose with stockings to keep it somewhat warm. Also, you need to place the machine on the floor and lower the hose so that water won't travel upward towards your mask.     Maintaining your CPAP/BPAP device:    The humidification chamber (aka water tank or water chamber) needs to be filled with distilled water to prevent buildup of white deposits in the future. If you cannot find distilled water, you can use tap water but expect to have white deposits buildup seen after prolonged use with tap water. If you start seeing white deposits on the water chamber, you can clean it by filling it with equal parts of distilled white vinegar and water. Let the vinegar-water mixture sit for 2 hours, and then rinse it with running tap " water. Clean with soap and water then let it dry.     You should try to keep your machine clean in order to work well. Here are some tips to clean PAP supplies / accessories:    Clean the humidification chamber (aka water tank) as well as your mask and tubing at least once a week with soap and water.   Alternatively, you can fill a sink or basin with warm water and add a little mild detergent, like Ivory dish soap. Gently wipe your supplies with the soapy water to free all the oils and dirt that may have collected. Once that's done, rinse these items with clean water until the soap is gone and let them air dry. You can hang your tubing over the curtain filippo in your bathroom so that it dries.  The mask insert (part of the mask that has contact with your skin) needs to be cleaned with soap and water daily. Another option is to wipe them down with CPAP wipes or baby wipes.    You should replace your mask and tubing frequently in order to prevent bacteria buildup, machine damage, and mask seal issues. The older the mask and hose, the high likelihood that there is bacteria buildup in it especially if they are not cleaned regularly. Dirty filters damage machines because build-up of dust and contaminants can cause machine to over-heat, and in time, damage the motor of machine. Cushions lose their seal over time as most masks are made of plastic and silicone while headgear is made of neoprene. These materials will break down with age and frequent use. Here is the recommended replacement schedule for PAP supplies / accessories:    Twice a month- disposable filters and cushions for nasal mask or nasal pillows.  Once a month- cushion for full face mask  Every 3 months- mask with headgear and PAP tubing (standard or heated hose)  Every 6 months- reusable filter, water chamber, and chin strap     Other useful information:    Some people do not put water in the tank while other people prefers to put water in the tank to prevent  mouth dryness. Try to experiment to determine which is more comfortable for you.   In general, new machines have 2 years warranty on parts while health insurance allows you to have a new machine once every 5 years.     You can also go to the following EDUCATION WEBSITES for further information:   American Academy of Sleep Medicine http://sleepeducation.org  National Sleep Foundation: https://sleepfoundation.org  American Sleep Apnea Association: https://www.sleepapnea.org (for patients with sleep apnea)    Here at [facility], we wish you a restful sleep!       IMPORTANT INFORMATION     Call 911 for medical emergencies.  Our offices are generally open from Monday-Friday, 9 am - 5 pm.  If you need to get in touch with me, you may either call me/my team (number is below) or you can use [employer].  If a referral for a test, for CPAP, or for another specialist was made, and you have not heard about scheduling this within a week, please call scheduling at [phone] ([phone]).  If you are unable to make your appointment for clinic or an overnight study, kindly call the office at least 48 hours in advance to cancel and reschedule.  If you are on CPAP, please bring your device's card or the device to each clinic appointment.   There are no supporting services by either the sleep doctors or their staff on weekends and Holidays, or after 5 PM on weekdays.     PRESCRIPTIONS     We require 7 days advanced notice for prescription refills. If we do not receive the request in this time, we cannot guarantee that your medication will be refilled in time.      IMPORTANT PHONE NUMBERS     [facility]: [phone]  [employer]): [phone]  [employer] ([employer]): [phone]  [facility] ([employer]): [phone]    CONTACTING YOUR SLEEP MEDICINE PROVIDER AND SLEEP TEAM      For issues with your machine or mask interface, please call your DME provider first. [employer] stands for durable  "medical company. DME is the company who provides you the machine and/or PAP supplies / accessories.   To schedule, cancel, or reschedule SLEEP STUDY APPOINTMENTS, please call the Main Phone Line at [phone] - option 3.   To schedule, cancel, or reschedule CLINIC APPOINTMENTS, you can do it in \"MdotLabshart\", call [phone] for [facility] office , [phone] for [place] office to speak with my on site staff, or call the Main Phone Line at [phone] - option 2  For CLINICAL QUESTIONS or MEDICATION REFILLS, please call direct line for Adult Sleep Nurses at [phone].   Lastly, you can also send a message directly to your provider through \"My Chart\", which is the email service through your  Records Account: [email or web address]     Adult Sleep Nurses (Alma Petit, GRADY and Malathi Lloyd RN):  For clinical questions and refilling prescriptions: [phone]  Email sleep diaries and other documents at: [email or web address]    Office locations for Tiffanie Ritter NP:    [address]  [place] [address] [phone]    [address]  [address]  [place] [address] [phone]      OUR SLEEP TESTING LOCATIONS     Our team will contact you to schedule your sleep study, however, you can contact us as follow:  Main Phone Line (scheduling only): [phone] ([phone]), option 3    Sleep Testing Locations:   [place] (18 years and older): [address] [place] [address], 2nd floor   [place] (18 years and older): [address] [facility]; 4th floor  After hours line: [phone]  [facility] (18 years and older) at [place]: [id number] [place] [address]  After hours line: [phone]   [facility] (Main campus: All ages): [facility], 6th floor. After hours line: [phone]   [place] (5 years and older; younger considered on case-by-case basis): [address]; [address], [address]. " Scheduling  After hours line: [phone]       Here at [facility], we wish you a restful sleep!    Your sleep medicine provider for this visit was: RADHA Cash-CNP

## 2024-06-21 ENCOUNTER — APPOINTMENT (OUTPATIENT)
Dept: ENDOCRINOLOGY | Facility: CLINIC | Age: 39
End: 2024-06-21
Payer: COMMERCIAL

## 2024-06-21 VITALS
WEIGHT: 247 LBS | HEART RATE: 79 BPM | BODY MASS INDEX: 36.58 KG/M2 | DIASTOLIC BLOOD PRESSURE: 86 MMHG | HEIGHT: 69 IN | SYSTOLIC BLOOD PRESSURE: 133 MMHG

## 2024-06-21 DIAGNOSIS — E66.9 OBESITY (BMI 30-39.9): Primary | ICD-10-CM

## 2024-06-21 DIAGNOSIS — R73.9 ELEVATED BLOOD SUGAR: ICD-10-CM

## 2024-06-21 LAB — POC FINGERSTICK BLOOD GLUCOSE: 112 MG/DL (ref 70–100)

## 2024-06-21 PROCEDURE — 99205 OFFICE O/P NEW HI 60 MIN: CPT | Performed by: INTERNAL MEDICINE

## 2024-06-21 PROCEDURE — 82962 GLUCOSE BLOOD TEST: CPT | Performed by: INTERNAL MEDICINE

## 2024-06-21 RX ORDER — MAGNESIUM 200 MG
TABLET ORAL
COMMUNITY

## 2024-06-21 RX ORDER — BISMUTH SUBSALICYLATE 262 MG
1 TABLET,CHEWABLE ORAL DAILY
COMMUNITY

## 2024-06-21 NOTE — PROGRESS NOTES
38-year-old patient, with past medical history of obesity, BRIAN, GERD testosterone, hyperlipidemia, presenting for endocrinology for evaluation of obesity.    Patient was seen in 2021 by Dr. Leonard for obesity and hypogonadism, patient was started on Trulicity?,  Lost few pounds initially, however the patient stopped the medication, due to the fear of potential increased risk of thyroid cancer.  HbA1c dropped from 6.5% in July 2021, to 5.2 in December 2021.  Patient was recommended to get sleep study, to treat BRIAN and hypogonadism.  Patient had a sleep study done twice so far, currently waiting on new CPAP machine.  Patient tried different diets, currently trying to limit bread sugar, and doing intermittent fasting to help lose weight.  Patient goes to the gym occasionally.  He wakes up tired, he also experiences fatigue after meals.  Does snore at night  Mentioned he usually has heat intolerance, has normal bowel movements, no muscle weakness, occasional muscle cramping.  Libido is fine, no erectile dysfunction, patient was never started on testosterone replacement.  He denies any night sweats or hot flashes.      Past Medical History:   Diagnosis Date    Personal history of other diseases of the nervous system and sense organs 07/12/2021    History of sleep apnea    Personal history of other mental and behavioral disorders     History of attention deficit hyperactivity disorder (ADHD)            Past Surgical History:   Procedure Laterality Date    KNEE SURGERY  03/07/2017    Knee Surgery    OTHER SURGICAL HISTORY  03/07/2017    Oral Surgery Tooth Extraction [facility]    WISDOM TOOTH EXTRACTION        Family history:  Mother thyroid cancer and breast cancer  Father type 2 diabetes  No Known Allergies         5/18/2023     2:57 PM 3/22/2024     1:20 PM 4/11/2024     2:55 PM 4/24/2024     2:14 PM 5/21/2024     7:36 PM 6/6/2024     1:54 PM 6/21/2024    10:12 AM   Vitals   Systolic  132 134 135  128 133  "  Diastolic  84 86 89  85 86   Heart Rate  76 90 84  80 79   Temp  36.6 °C (97.9 °F)  36.6 °C (97.9 °F)      Resp  18 16 20  18    Height (in) 1.753 m (5' 9\") 1.753 m (5' 9\") 1.753 m (5' 9\") 1.753 m (5' 9\") 1.753 m (5' 9.02\") 1.753 m (5' 9\") 1.753 m (5' 9\")   Weight (lb) 240 247 247.9 247 246.92 247.7 247   BMI 35.44 kg/m2 36.48 kg/m2 36.61 kg/m2 36.48 kg/m2 36.45 kg/m2 36.58 kg/m2 36.48 kg/m2   BSA (m2) 2.3 m2 2.34 m2 2.34 m2 2.34 m2 2.34 m2 2.34 m2 2.34 m2   Visit Report  Report Report Report  Report Report      Physical exam:  ==========  General: CCO X.3, NAD, on room air, obese  HEENT: Normal thyroid gland, no palpable thyroid nodules, no cervical lymphadenopathy\  acanthosis, skin tags  Not tachycardic, not tachypneic.  Abdomen: Soft nontender none distended, positive bowel sounds, no dark purpleish striae  Extremities: No tremor, no lower extremity swelling, DTRs none delayed laxation phase.      Current Outpatient Medications on File Prior to Visit   Medication Sig Dispense Refill    magnesium 200 mg tablet Take by mouth.      multivitamin tablet Take 1 tablet by mouth once daily.       No current facility-administered medications on file prior to visit.        Lab Results   Component Value Date     04/12/2024    K 4.3 04/12/2024     04/12/2024    CO2 28 04/12/2024    BUN 17 04/12/2024    CREATININE 0.94 04/12/2024    CALCIUM 9.4 04/12/2024    PROT 7.3 04/12/2024    BILITOT 1.1 04/12/2024    ALKPHOS 37 04/12/2024    ALT 37 04/12/2024    AST 20 04/12/2024    GLUCOSE 113 (H) 04/12/2024        Lab Results   Component Value Date    TSH 1.73 04/12/2024    FSH 5.3 04/12/2024    LH 3.7 04/12/2024    PROLACTIN 5.8 04/12/2024      Imaging:  =======    ---      Assessment/plan:  ============    # Male hypogonadism:  -BRIAN playing significant role and suppressed testosterone levels     PLAN :   - discussed fertility. he is not planning any kids at this time, he has 3 kids.   - will repeat testosterone total " and free with SHBG in 6 months and focus on weight loss   - no concerns of pituitary lesions at this time, no MRI at this time      # insulin resistance/ Obesity: A1c 5.2 % 12/2021 [few months post Trulicity course]  insulin level was 50 with C peptide 3.6 for BG in 100s   discussed he likely has metabolic syndrome. low suspicion for insulinoma at this time       In office:  Blood glucose~110  C-peptide:  HbA1c: 5.5%    -Given the absence of diabetes diagnosis, patient will not be able to receive GLP-1 agonist  -Metformin 1000 mg 1 tablet orally twice daily [patient instructed on titrating up slowly and gradually lower dose with a starting dose 500 mg 1 tablet orally daily with biggest meal for 1 week, followed by 500 mg 1 tablet orally twice daily with meals for 1 week, followed by 1000 mg in a.m. and 500 mg in p.m. for 1 week, followed by goal metformin dose]  -Patient instructed on potential side effects, bloating diarrhea etc.  -Treat sleep apnea with CPAP machine  -Implement healthy diet modifications.  -Recommend atorvastatin 40 mg 1 tablet orally daily.  -Labs in 4 to 6 months  RTC 6 months  Patient was seen, examined, and case discussed with

## 2024-06-22 RX ORDER — METFORMIN HYDROCHLORIDE 500 MG/1
1000 TABLET ORAL
Qty: 90 TABLET | Refills: 1 | Status: SHIPPED | OUTPATIENT
Start: 2024-06-22

## 2024-06-22 RX ORDER — ATORVASTATIN CALCIUM 40 MG/1
40 TABLET, FILM COATED ORAL DAILY
Qty: 90 TABLET | Refills: 3 | Status: SHIPPED | OUTPATIENT
Start: 2024-06-22 | End: 2025-06-22

## 2024-06-27 ENCOUNTER — APPOINTMENT (OUTPATIENT)
Dept: PRIMARY CARE | Facility: CLINIC | Age: 39
End: 2024-06-27
Payer: COMMERCIAL

## 2024-08-12 ENCOUNTER — APPOINTMENT (OUTPATIENT)
Dept: PRIMARY CARE | Facility: CLINIC | Age: 39
End: 2024-08-12
Payer: COMMERCIAL

## 2024-09-26 ENCOUNTER — APPOINTMENT (OUTPATIENT)
Dept: SLEEP MEDICINE | Facility: CLINIC | Age: 39
End: 2024-09-26
Payer: COMMERCIAL

## 2024-10-10 ENCOUNTER — APPOINTMENT (OUTPATIENT)
Dept: PRIMARY CARE | Facility: CLINIC | Age: 39
End: 2024-10-10
Payer: COMMERCIAL

## 2024-12-03 ENCOUNTER — APPOINTMENT (OUTPATIENT)
Dept: ENDOCRINOLOGY | Facility: CLINIC | Age: 39
End: 2024-12-03
Payer: COMMERCIAL

## 2024-12-06 ENCOUNTER — APPOINTMENT (OUTPATIENT)
Dept: PRIMARY CARE | Facility: CLINIC | Age: 39
End: 2024-12-06
Payer: COMMERCIAL

## 2024-12-20 ENCOUNTER — APPOINTMENT (OUTPATIENT)
Facility: CLINIC | Age: 39
End: 2024-12-20
Payer: COMMERCIAL

## 2024-12-20 VITALS
DIASTOLIC BLOOD PRESSURE: 86 MMHG | HEIGHT: 69 IN | WEIGHT: 231 LBS | OXYGEN SATURATION: 97 % | SYSTOLIC BLOOD PRESSURE: 130 MMHG | TEMPERATURE: 97.4 F | BODY MASS INDEX: 34.21 KG/M2 | RESPIRATION RATE: 18 BRPM

## 2024-12-20 DIAGNOSIS — E78.5 HYPERLIPIDEMIA, UNSPECIFIED HYPERLIPIDEMIA TYPE: ICD-10-CM

## 2024-12-20 DIAGNOSIS — E66.811 CLASS 1 OBESITY DUE TO EXCESS CALORIES WITH SERIOUS COMORBIDITY AND BODY MASS INDEX (BMI) OF 34.0 TO 34.9 IN ADULT: ICD-10-CM

## 2024-12-20 DIAGNOSIS — R03.0 ELEVATED BLOOD PRESSURE READING: Primary | ICD-10-CM

## 2024-12-20 DIAGNOSIS — G47.33 OBSTRUCTIVE SLEEP APNEA: ICD-10-CM

## 2024-12-20 DIAGNOSIS — E66.09 CLASS 1 OBESITY DUE TO EXCESS CALORIES WITH SERIOUS COMORBIDITY AND BODY MASS INDEX (BMI) OF 34.0 TO 34.9 IN ADULT: ICD-10-CM

## 2024-12-20 PROCEDURE — 99214 OFFICE O/P EST MOD 30 MIN: CPT | Performed by: FAMILY MEDICINE

## 2024-12-20 PROCEDURE — 1036F TOBACCO NON-USER: CPT | Performed by: FAMILY MEDICINE

## 2024-12-20 PROCEDURE — 3008F BODY MASS INDEX DOCD: CPT | Performed by: FAMILY MEDICINE

## 2024-12-20 RX ORDER — TIRZEPATIDE 5 MG/.5ML
5 INJECTION, SOLUTION SUBCUTANEOUS
COMMUNITY
Start: 2024-10-15

## 2024-12-20 ASSESSMENT — ENCOUNTER SYMPTOMS
HEADACHES: 0
HYPERTENSION: 1
SHORTNESS OF BREATH: 0

## 2024-12-20 NOTE — ASSESSMENT & PLAN NOTE
Patient was instructed to record blood pressures (using an arm BP monitor) at home 1-2 times per day (per AHA guidelines) and to follow up in office for a blood pressure recheck in 4-6 weeks.  I also encouraged low-sodium diet and regular exercise.  I also discussed with patient the importance of good blood pressure control to avoid long-term complications such as heart attack and stroke.      Orders:    Follow Up In Primary Care - Established; Future

## 2024-12-20 NOTE — PROGRESS NOTES
Subjective     Alcon Ramírez is a 39 y.o. male who presents for Hypertension.    Hypertension  Associated symptoms include anxiety. Pertinent negatives include no chest pain, headaches or shortness of breath.     Pt has history of prediabetes and hypogonadism, formerly treated by Dr. Sanders in 2021 with trulicity however stopped taking it because he didn't feel it was helping with his weight.  He had trouble getting back into see Dr. Sanders so he went to a  new endocrinologist (Dr. Telma Ni)  in June 2024 for evaluation of obesity and hypogonadism.  He was started on metformin but it caused upset stomach so it was stopped taking it.  He was also prescribed atorvastatin from the endocrinologist but he never started it.      He has BRIAN evaluated by sleep specialist and apparently a cpap was ordered but patient never received it.  He does not have a cpap device.      He was started on zepbound through a telehealth provider in early Oct. 2024.  He is currently taking  10 mg of zepbound once a week and is going up to 12.5 mg dose next week.  He is tolerating the medication well, no GI side effects.  He has a telehealth visit with the weight loss clinic.  He has lost approximately 25 lbs since starting zepbound.  He is also cutting carbs and counting calories.      He is here today for recheck on his blood pressure.  He is not checking his blood pressures.  He does not have a bp monitor.     He also wants to get his lipids rechecked since losing weight.     He stopped eating fast food.  He is eating at home more.        Review of Systems   Respiratory:  Negative for shortness of breath.    Cardiovascular:  Negative for chest pain.   Neurological:  Negative for headaches.       Objective     Vitals:    12/20/24 1301 12/20/24 1337   BP: 132/84 130/86   BP Location: Left arm    Patient Position: Sitting    Resp: 18    Temp: 36.3 °C (97.4 °F)    TempSrc: Temporal    SpO2: 97%    Weight: 105 kg (231 lb)    Height: 1.753 m (5'  "9\")         Current Outpatient Medications   Medication Instructions    Zepbound 5 mg, Every 7 days        No Known Allergies     Past Surgical History:   Procedure Laterality Date    KNEE SURGERY  03/07/2017    Knee Surgery    OTHER SURGICAL HISTORY  03/07/2017    Oral Surgery Tooth Extraction [facility]    WISDOM TOOTH EXTRACTION          Social History     Tobacco Use    Smoking status: Never    Smokeless tobacco: Never   Vaping Use    Vaping status: Never Used   Substance Use Topics    Alcohol use: Not Currently    Drug use: Never        Family History   Problem Relation Name Age of Onset    Thyroid cancer Mother      Breast cancer Mother      Diabetes type II Father          Immunization History   Administered Date(s) Administered    COVID-19, mRNA, LNP-S, PF, 30 mcg/0.3 mL dose 01/03/2022    Flu vaccine (IIV4), preservative free *Check age/dose* 10/15/2021    Influenza, injectable, quadrivalent 10/29/2019    MMR vaccine, subcutaneous (MMR II) 08/21/1998    Pneumococcal Conjugate PCV 7 1985    Tdap vaccine, age 7 year and older (BOOSTRIX, ADACEL) 11/23/2017        Physical Exam  Vitals reviewed.   Constitutional:       General: He is not in acute distress.     Appearance: Normal appearance. He is well-developed. He is obese.   HENT:      Head: Normocephalic and atraumatic.   Eyes:      General: Lids are normal.      Conjunctiva/sclera:      Right eye: Right conjunctiva is not injected.      Left eye: Left conjunctiva is not injected.   Cardiovascular:      Rate and Rhythm: Normal rate and regular rhythm.      Heart sounds: No murmur heard.  Pulmonary:      Effort: Pulmonary effort is normal. No respiratory distress.      Breath sounds: Normal breath sounds. No wheezing, rhonchi or rales.   Skin:     General: Skin is warm and dry.      Findings: No rash.   Neurological:      Mental Status: He is alert and oriented to person, place, and time. Mental status is at baseline.   Psychiatric:         Mood and " Affect: Mood normal.         Behavior: Behavior normal.         Assessment & Plan  Elevated blood pressure reading  Patient was instructed to record blood pressures (using an arm BP monitor) at home 1-2 times per day (per AHA guidelines) and to follow up in office for a blood pressure recheck in 4-6 weeks.  I also encouraged low-sodium diet and regular exercise.  I also discussed with patient the importance of good blood pressure control to avoid long-term complications such as heart attack and stroke.      Orders:    Follow Up In Primary Care - Established; Future    Hyperlipidemia, unspecified hyperlipidemia type  Not on statin, pt is trying diet and exercise.  His endocrinologist has lipid lab orders to complete.  Pt will complete.   Orders:    Follow Up In Primary Care - Established; Future    Obstructive sleep apnea  Not on cpap        Class 1 obesity due to excess calories with serious comorbidity and body mass index (BMI) of 34.0 to 34.9 in adult  On zepbound through online telehealth provider.  Pt is doing well.      Patient is aware of the black box warning for GLP-1 medications.  Patient denies a personal or family hx of medullary thyroid carcinoma or a personal history of multiple endocrine neoplasia syndrome type 2 (MEN-2).   Pt is aware of the common side effects of GLP-1 medications including including nausea, vomiting, diarrhea, or constipation.

## 2024-12-20 NOTE — ASSESSMENT & PLAN NOTE
Not on statin, pt is trying diet and exercise.  His endocrinologist has lipid lab orders to complete.  Pt will complete.   Orders:    Follow Up In Primary Care - Established; Future

## 2024-12-20 NOTE — ASSESSMENT & PLAN NOTE
On zepbound through online telehealth provider.  Pt is doing well.      Patient is aware of the black box warning for GLP-1 medications.  Patient denies a personal or family hx of medullary thyroid carcinoma or a personal history of multiple endocrine neoplasia syndrome type 2 (MEN-2).   Pt is aware of the common side effects of GLP-1 medications including including nausea, vomiting, diarrhea, or constipation.

## 2025-01-24 ENCOUNTER — APPOINTMENT (OUTPATIENT)
Facility: CLINIC | Age: 40
End: 2025-01-24
Payer: COMMERCIAL

## 2025-03-12 ENCOUNTER — APPOINTMENT (OUTPATIENT)
Dept: URGENT CARE | Age: 40
End: 2025-03-12
Payer: COMMERCIAL

## 2025-07-02 NOTE — PROGRESS NOTES
Chief Complaint   Patient presents with    Right Wrist - Pain, New Patient Visit     Xrays today     History of Present Illness:  Alcon Ramírez is a 39 y.o. male presenting to clinic as a established patient for his right wrist . We have seen him in the past for his left shoulder. He returns today with a lump in the right wrist. He denies any injury.     Imaging:  X-rays right wrist : Shows no acute fractures or dislocations. No arthritic change. There is an ossicle at the end of the distal radius.        Assessment:   Right DeQuervain's tenosynovitis     Plan:  We reviewed the role of imaging, physical therapy, injections and the time frame to healing and correlation with outcome.  Medrol Dosepak  NSAID: Ibuprofen consistently for one week then as needed. GI side effects and medical risks discussed.  Ice: 60 minutes on and off  At this point we will get a MRI of the right wrist for DeQuervain's tenosynovitis. He will follow-up with Dr. Carlisle afterwards to discuss the results.      Physical Exam:  Right Wrist:  Volar flexion, dorsiflexion, pronation/supination without limitation  No tenderness to palpation over distal radius, distal ulna or TFCC  No tenderness to palpation over the scaphoid  Negative piano key sign  Positive Finkelstein test  Pain at the base of the thumb and is moderately swollen.   No pain with CMC grind  Neurovascular exam normal distally      Review of Systems:  GENERAL: Negative for malaise, significant weight loss, fever  MUSCULOSKELETAL: See HPI  NEURO:  Negative     Medical History[1]    Medication Documentation Review Audit       Reviewed by Elis Paul MA (Medical Assistant) on 12/20/24 at 1301      Medication Order Taking? Sig Documenting Provider Last Dose Status   atorvastatin (Lipitor) 40 mg tablet [id number]  Take 1 tablet (40 mg) by mouth once daily. Monica Moncada MD  Active   magnesium 200 mg tablet [id number]  Take by mouth. Historical Provider, MD  Active   metFORMIN  (Glucophage) 500 mg tablet [id number]  Take 2 tablets (1,000 mg) by mouth 2 times daily (morning and late afternoon). Patient was started on titrating up the dose, with the start dose 500 mg 1 tablet orally daily for 1 week, followed by 500 mg twice daily for 1 week, followed by 1000 mg a.m. and 500 mg in p.m., followed by 100 Monica Moncada MD  Active   multivitamin tablet [id number]  Take 1 tablet by mouth once daily. Historical Provider, MD  Active   Zepbound 5 mg/0.5 mL injection [id number] Yes Inject 5 mg under the skin every 7 days. Historical Provider, MD  Active                    RX Allergies[2]    Social History     Socioeconomic History    Marital status:      Spouse name: Francis Jiménez    Number of children: 3    Years of education: Not on file    Highest education level: Not on file   Occupational History    Occupation:    Tobacco Use    Smoking status: Never    Smokeless tobacco: Never   Vaping Use    Vaping status: Never Used   Substance and Sexual Activity    Alcohol use: Not Currently    Drug use: Never    Sexual activity: Yes   Other Topics Concern    Not on file   Social History Narrative    Not on file     Social Drivers of Health     Financial Resource Strain: Not on file   Food Insecurity: Not on file   Transportation Needs: Not on file   Physical Activity: Not on file   Stress: Not on file   Social Connections: Not on file   Intimate Partner Violence: Not on file   Housing Stability: Not on file       Surgical History[3]    Imaging  No results found.    Cardiology, Vascular, and Other Imaging  No other imaging results found for the past 7 days       Scribe Attestation:  By signing my name below, IJanice Scribe attest that this documentation has been prepared under the direction and in the presence of Roni Chavarria MD.    Provider Attestation - Scribe documentation:  All medical record entries made by Janice Contreras were at my direction and personally dictated by me, Roni ROMERO  MD Deon. I have reviewed the chart and agree that the record is accurate and I confirmed that it reflects my personal performance of the history, physical exam, discussion, and plan.              [1]   Past Medical History:  Diagnosis Date    Personal history of other diseases of the nervous system and sense organs 07/12/2021    History of sleep apnea    Personal history of other mental and behavioral disorders     History of attention deficit hyperactivity disorder (ADHD)   [2] No Known Allergies  [3]   Past Surgical History:  Procedure Laterality Date    KNEE SURGERY  03/07/2017    Knee Surgery    OTHER SURGICAL HISTORY  03/07/2017    Oral Surgery Tooth Extraction [place] [facility]    WISDOM TOOTH EXTRACTION

## 2025-07-03 ENCOUNTER — HOSPITAL ENCOUNTER (OUTPATIENT)
Dept: RADIOLOGY | Facility: CLINIC | Age: 40
Discharge: HOME | End: 2025-07-03
Payer: COMMERCIAL

## 2025-07-03 ENCOUNTER — APPOINTMENT (OUTPATIENT)
Dept: ORTHOPEDIC SURGERY | Facility: CLINIC | Age: 40
End: 2025-07-03
Payer: COMMERCIAL

## 2025-07-03 DIAGNOSIS — M25.531 RIGHT WRIST PAIN: ICD-10-CM

## 2025-07-03 DIAGNOSIS — M65.4 DE QUERVAIN'S TENOSYNOVITIS, RIGHT: Primary | ICD-10-CM

## 2025-07-03 PROCEDURE — 73110 X-RAY EXAM OF WRIST: CPT | Mod: RT

## 2025-07-03 PROCEDURE — 73110 X-RAY EXAM OF WRIST: CPT | Mod: RIGHT SIDE | Performed by: RADIOLOGY

## 2025-07-03 PROCEDURE — 99202 OFFICE O/P NEW SF 15 MIN: CPT | Performed by: ORTHOPAEDIC SURGERY

## 2025-07-03 RX ORDER — IBUPROFEN 800 MG/1
800 TABLET, FILM COATED ORAL EVERY 8 HOURS PRN
Qty: 90 TABLET | Refills: 1 | Status: SHIPPED | OUTPATIENT
Start: 2025-07-03

## 2025-07-03 RX ORDER — METHYLPREDNISOLONE 4 MG/1
TABLET ORAL
Qty: 21 TABLET | Refills: 0 | Status: SHIPPED | OUTPATIENT
Start: 2025-07-03

## 2025-07-30 ENCOUNTER — APPOINTMENT (OUTPATIENT)
Dept: RADIOLOGY | Facility: CLINIC | Age: 40
End: 2025-07-30
Payer: COMMERCIAL

## 2025-08-12 ENCOUNTER — APPOINTMENT (OUTPATIENT)
Facility: CLINIC | Age: 40
End: 2025-08-12
Payer: COMMERCIAL

## 2025-08-14 ENCOUNTER — APPOINTMENT (OUTPATIENT)
Dept: RADIOLOGY | Facility: CLINIC | Age: 40
End: 2025-08-14
Payer: COMMERCIAL